# Patient Record
Sex: MALE | Race: WHITE
[De-identification: names, ages, dates, MRNs, and addresses within clinical notes are randomized per-mention and may not be internally consistent; named-entity substitution may affect disease eponyms.]

---

## 2018-01-28 ENCOUNTER — HOSPITAL ENCOUNTER (EMERGENCY)
Dept: HOSPITAL 17 - NEPD | Age: 43
Discharge: HOME | End: 2018-01-28
Payer: MEDICAID

## 2018-01-28 VITALS
DIASTOLIC BLOOD PRESSURE: 75 MMHG | OXYGEN SATURATION: 99 % | SYSTOLIC BLOOD PRESSURE: 120 MMHG | RESPIRATION RATE: 16 BRPM | HEART RATE: 78 BPM

## 2018-01-28 VITALS
TEMPERATURE: 98.4 F | SYSTOLIC BLOOD PRESSURE: 123 MMHG | OXYGEN SATURATION: 98 % | HEART RATE: 70 BPM | RESPIRATION RATE: 17 BRPM | DIASTOLIC BLOOD PRESSURE: 70 MMHG

## 2018-01-28 VITALS — BODY MASS INDEX: 29.22 KG/M2 | WEIGHT: 220.46 LBS | HEIGHT: 73 IN

## 2018-01-28 DIAGNOSIS — F17.200: ICD-10-CM

## 2018-01-28 DIAGNOSIS — M54.5: Primary | ICD-10-CM

## 2018-01-28 PROCEDURE — 96372 THER/PROPH/DIAG INJ SC/IM: CPT

## 2018-01-28 PROCEDURE — 99284 EMERGENCY DEPT VISIT MOD MDM: CPT

## 2018-01-28 NOTE — PD
HPI


Chief Complaint:  Back/ Neck Pain or Injury


Time Seen by Provider:  15:34


Travel History


International Travel<30 days:  No


Contact w/Intl Traveler<30days:  No


Traveled to known affect area:  No





History of Present Illness


HPI


42 year old male presents to the emergency department for evaluation of right 

low back pain that started yesterday when he stood up.  No traumatic injury.  

Patient denies any fevers or chills.  No loss of bowel or bladder control.  No 

saddle anesthesias.  Patient reports history of similar back pain in the past, 

last time was approximately 2 years ago.  Patient took Ibuprofen earlier 

without improvement.  Patient denies any chronic medical problems or takes any 

medications.  No radiation of pain.  Movement exacerbates pain.  Rest without 

help alleviate pain.  Moderate severity.





PFSH


Past Medical History


Medical History:  Denies Significant Hx


Tetanus Vaccination:  Unknown


Influenza Vaccination:  No





Past Surgical History


Other Surgery:  Yes (R. KNEE AND R. SHOULDER SURGERY)





Social History


Alcohol Use:  Yes (SOCIAL)


Tobacco Use:  Yes (1 PPD)


Substance Use:  No





Allergies-Medications


(Allergen,Severity, Reaction):  


Coded Allergies:  


     No Known Allergies (Unverified , 1/28/18)


Reported Meds & Prescriptions





Reported Meds & Active Scripts


Active


No Active Prescriptions or Reported Medications    








Review of Systems


Except as stated in HPI:  all other systems reviewed are Neg





Physical Exam


Narrative


GENERAL: Well-nourished, well-developed male patient ambulatory.  Afebrile., 


SKIN: Focused skin assessment warm/dry.


HEAD: Normocephalic.  Atraumatic.


EYES: No scleral icterus. No injection or drainage. 


NECK: Supple, trachea midline. No JVD or lymphadenopathy.


CARDIOVASCULAR: Regular rate and rhythm without murmurs, gallops, or rubs.  

Bilateral radial and pedal pulses are 2+.


RESPIRATORY: Breath sounds equal bilaterally. No accessory muscle use.  Lungs 

sounds are clear to auscultation.


GASTROINTESTINAL: Abdomen soft, non-tender, nondistended. 


MUSCULOSKELETAL: No cyanosis, or edema.  Straight leg raise is negative 

bilaterally.  Bilateral upper and lower extremity strength 5/5.  All 

extremities are neurovascularly intact.


BACK: Nontender without obvious deformity. No CVA tenderness.  Patient has 

tenderness over right lumbar paraspinal musculature.





Data


Data


Last Documented VS





Vital Signs








  Date Time  Temp Pulse Resp B/P (MAP) Pulse Ox O2 Delivery O2 Flow Rate FiO2


 


1/28/18 12:40 98.4 70 17 123/70 (87) 98   








Orders





 Orders


Ketorolac Inj (Toradol Inj) (1/28/18 16:00)


Orphenadrine Inj (Norflex Inj) (1/28/18 16:00)








[facility]


Medical Decision Making


Medical Screen Exam Complete:  Yes


Emergency Medical Condition:  Yes


Medical Record Reviewed:  Yes


Differential Diagnosis


Muscle strain versus muscle spasm versus herniated disc versus sciatica


Narrative Course


42-year-old male presents to the emergency department for evaluation of low 

back pain that started yesterday when he stood up.  No traumatic injury.  No 

red flag symptoms.





Patient is given Toradol 60 mg IM and Norflex 60 mg IM for pain.





Patient will be discharged with a prescription for diclofenac and Robaxin.  He 

is encouraged to follow-up with primary care physician.  He is to use a heating 

pad on low.  He verbalizes agreement and understanding.





The patient was discharged in stable condition with instructions, including 

return instructions and follow up instructions.





Diagnosis





 Primary Impression:  


 Low back pain


 Qualified Codes:  M54.5 - Low back pain


Referrals:  


Primary Care Physician


call for appointment


Patient Instructions:  Back Pain (ED), General Instructions


Departure Forms:  Tests/Procedures, Work Release   Enter return to work date:  

Jan 31, 2018





***Additional Instructions:  


Take diclofenac as directed as needed with food for pain.  Do not take with 

other anti-inflammatories including ibuprofen and naproxen.


Take Robaxin as directed as needed.


Heating pad on low for 20 minutes 4-5 times daily.


Follow-up with your primary care physician.


Return to the emergency department for any acute worsening of symptoms.


***Med/Other Pt SpecificInfo:  Prescription(s) given


Scripts


Methocarbamol (Robaxin) 750 Mg Tab


750 MG PO TID Y for MUSCLE SPASM, #21 TAB 0 Refills


   Prov: Sis Wang         1/28/18 


Diclofenac Potassium (Diclofenac Potassium) 50 Mg Tab


50 MG PO TID Y for PAIN SCALE 1 TO 10, #21 TAB 0 Refills


   Prov: Sis Wang         1/28/18


Disposition:  01 DISCHARGE HOME


Condition:  Stable











Sis Wang Jan 28, 2018 16:15